# Patient Record
Sex: FEMALE | Race: ASIAN | NOT HISPANIC OR LATINO | Employment: FULL TIME | URBAN - METROPOLITAN AREA
[De-identification: names, ages, dates, MRNs, and addresses within clinical notes are randomized per-mention and may not be internally consistent; named-entity substitution may affect disease eponyms.]

---

## 2023-12-12 ENCOUNTER — OFFICE VISIT (OUTPATIENT)
Dept: FAMILY MEDICINE | Facility: CLINIC | Age: 38
End: 2023-12-12
Payer: COMMERCIAL

## 2023-12-12 VITALS
DIASTOLIC BLOOD PRESSURE: 69 MMHG | TEMPERATURE: 97.8 F | HEIGHT: 56 IN | SYSTOLIC BLOOD PRESSURE: 114 MMHG | WEIGHT: 113 LBS | OXYGEN SATURATION: 98 % | RESPIRATION RATE: 16 BRPM | HEART RATE: 74 BPM | BODY MASS INDEX: 25.42 KG/M2

## 2023-12-12 DIAGNOSIS — Z76.89 ENCOUNTER TO ESTABLISH CARE: Primary | ICD-10-CM

## 2023-12-12 DIAGNOSIS — M77.11 RIGHT LATERAL EPICONDYLITIS: ICD-10-CM

## 2023-12-12 PROCEDURE — 90686 IIV4 VACC NO PRSV 0.5 ML IM: CPT | Performed by: FAMILY MEDICINE

## 2023-12-12 PROCEDURE — 90471 IMMUNIZATION ADMIN: CPT | Performed by: FAMILY MEDICINE

## 2023-12-12 PROCEDURE — 99203 OFFICE O/P NEW LOW 30 MIN: CPT | Mod: 25 | Performed by: FAMILY MEDICINE

## 2023-12-12 NOTE — PROGRESS NOTES
"  Assessment & Plan     Encounter to establish care      Right lateral epicondylitis  Symptomatic care discussed included counterforce brace, consider PT, consider steroid injection down the road.  - Wrist/Arm Supplies Order Tennis Elbow Arm Band; Right    Review of external notes as documented elsewhere in note  40 minutes spent by me on the date of the encounter doing chart review, review of outside records, review of test results, interpretation of tests, patient visit, and documentation        BMI:   Estimated body mass index is 25.33 kg/m  as calculated from the following:    Height as of this encounter: 1.422 m (4' 8\").    Weight as of this encounter: 51.3 kg (113 lb).       This note was completed in part using a voice recognition software, any grammatical or context distortion are unintentional and inherent to the software.      Derek Gavin MD  [facility]    Yvon Schulz is a 38 year old, presenting for the following health issues:  Establish Care and Pain (Pain in the right arm)      2023     2:20 PM   Additional Questions   Roomed by hser   Accompanied by self       Pain    History of Present Illness       Reason for visit:  Establish care/ pain in the right arm    She eats 4 or more servings of fruits and vegetables daily.She consumes 1 sweetened beverage(s) daily.She exercises with enough effort to increase her heart rate 9 or less minutes per day.  She exercises with enough effort to increase her heart rate 3 or less days per week.   She is taking medications regularly.       She just recently relocated from [place], she is here to establish care, she complains of right lateral elbow pain, no injury mild to moderate throbbing pain exacerbated with movement, has not started any treatment.  Not currently taking any medication, past medical surgery included a  section on ovarian cyst.  She is  with 4 kids.      Review of Systems   Constitutional, HEENT, " "cardiovascular, pulmonary, gi and gu systems are negative, except as otherwise noted.      Objective    /69 (BP Location: Right arm, Patient Position: Sitting, Cuff Size: Adult Regular)   Pulse 74   Temp 97.8  F (36.6  C) (Temporal)   Resp 16   Ht 1.422 m (4' 8\")   Wt 51.3 kg (113 lb)   SpO2 98%   BMI 25.33 kg/m    Body mass index is 25.33 kg/m .  Physical Exam   GENERAL: healthy, alert and no distress  NECK: no adenopathy, no asymmetry, masses, or scars and thyroid normal to palpation  RESP: lungs clear to auscultation - no rales, rhonchi or wheezes  CV: regular rate and rhythm, normal S1 S2, no S3 or S4, no murmur, click or rub, no peripheral edema and peripheral pulses strong  ABDOMEN: soft, nontender, no hepatosplenomegaly, no masses and bowel sounds normal  MS: Mild tenderness at the right lateral epicondyle area, no swelling redness    No results found for any visits on 12/12/23.    This note was completed in part using a voice recognition software, any grammatical or context distortion are unintentional and inherent to the software.            Prior to immunization administration, verified patients identity using patient s name and date of birth. Please see Immunization Activity for additional information.     Screening Questionnaire for Adult Immunization    Are you sick today?   No   Do you have allergies to medications, food, a vaccine component or latex?   No   Have you ever had a serious reaction after receiving a vaccination?   No   Do you have a long-term health problem with heart, lung, kidney, or metabolic disease (e.g., diabetes), asthma, a blood disorder, no spleen, complement component deficiency, a cochlear implant, or a spinal fluid leak?  Are you on long-term aspirin therapy?   No   Do you have cancer, leukemia, HIV/AIDS, or any other immune system problem?   No   Do you have a parent, brother, or sister with an immune system problem?   No   In the past 3 months, have you taken " medications that affect  your immune system, such as prednisone, other steroids, or anticancer drugs; drugs for the treatment of rheumatoid arthritis, Crohn s disease, or psoriasis; or have you had radiation treatments?   No   Have you had a seizure, or a brain or other nervous system problem?   No   During the past year, have you received a transfusion of blood or blood    products, or been given immune (gamma) globulin or antiviral drug?   No   For women: Are you pregnant or is there a chance you could become       pregnant during the next month?   No   Have you received any vaccinations in the past 4 weeks?   No     Immunization questionnaire answers were all negative.      Patient instructed to remain in clinic for 15 minutes afterwards, and to report any adverse reactions.     Screening performed by Kelly Schulz MA on 12/12/2023 at 2:25 PM.

## 2023-12-13 PROBLEM — M77.11 RIGHT LATERAL EPICONDYLITIS: Status: ACTIVE | Noted: 2023-12-13

## 2025-07-27 ENCOUNTER — OFFICE VISIT (OUTPATIENT)
Dept: URGENT CARE | Facility: URGENT CARE | Age: 40
End: 2025-07-27
Payer: COMMERCIAL

## 2025-07-27 ENCOUNTER — VIRTUAL VISIT (OUTPATIENT)
Dept: INTERPRETER SERVICES | Facility: CLINIC | Age: 40
End: 2025-07-27

## 2025-07-27 VITALS
RESPIRATION RATE: 16 BRPM | WEIGHT: 122.6 LBS | SYSTOLIC BLOOD PRESSURE: 125 MMHG | HEART RATE: 89 BPM | DIASTOLIC BLOOD PRESSURE: 83 MMHG | TEMPERATURE: 98 F | BODY MASS INDEX: 27.58 KG/M2 | OXYGEN SATURATION: 98 %

## 2025-07-27 DIAGNOSIS — G43.109 MIGRAINE WITH AURA AND WITHOUT STATUS MIGRAINOSUS, NOT INTRACTABLE: Primary | ICD-10-CM

## 2025-07-27 PROCEDURE — T1013 SIGN LANG/ORAL INTERPRETER: HCPCS | Mod: U4

## 2025-07-27 PROCEDURE — 3074F SYST BP LT 130 MM HG: CPT

## 2025-07-27 PROCEDURE — 99214 OFFICE O/P EST MOD 30 MIN: CPT

## 2025-07-27 PROCEDURE — 3079F DIAST BP 80-89 MM HG: CPT

## 2025-07-27 RX ORDER — SUMATRIPTAN 50 MG/1
50 TABLET, FILM COATED ORAL
Qty: 30 TABLET | Refills: 1 | Status: SHIPPED | OUTPATIENT
Start: 2025-07-27

## 2025-07-27 NOTE — PROGRESS NOTES
Urgent Care Clinic Visit    Chief Complaint   Patient presents with    Headache     Rt side headache and throbbing pain since yesterday night. Vomiting yesterday. Nausea. Hx headache in the past.               7/27/2025     2:31 PM   Additional Questions   Roomed by Shakir Long MA   Accompanied by Daughter       Sage Bernal MD on 7/27/2025 at 2:32 PM    Assessment & Plan     Migraine with aura and without status migrainosus, not intractable  Patient's presentation is very consistent with migraines.  Will do a trial of triptans.  Provided counseling on this medication.  Patient previously had been receiving monthly injections for migraines.  I will put in a referral to neurology if she would like to have this done.  Is unclear what medication she was having injected for this was in [place] and an CARMINE would need to be completed.  Patient is going to follow-up with her primary care provider on the status of this referral and ongoing management of her migraines.  - SUMAtriptan (IMITREX) 50 MG tablet  Dispense: 30 tablet; Refill: 1  - Adult Neurology  Referral     No follow-ups on file.    Sage Bernal MD  [facility] [place]    Yvon Schulz is a 40 year old female who presents to clinic today for the following health issues:  Chief Complaint   Patient presents with    Headache     Rt side headache and throbbing pain since yesterday night. Vomiting yesterday. Nausea. Hx headache in the past.         7/27/2025     2:31 PM   Additional Questions   Roomed by Shakir Long MA   Accompanied by Daughter     HPI    Patient states that she had a headache last night at 7 pm, she describes it as a throbbing pain, she has had sensitivity to light and loud noises when these headaches appear. Use to need monthly injections for these headaches. Has had vision changes similar to aura with lights, blurriness and zig zag. Patient endorses these headaches occurring every 2-3 days.          Objective    /83   Pulse 89   Temp 98  F (36.7  C) (Oral)   Resp 16   Wt 55.6 kg (122 lb 9.6 oz)   SpO2 98%   BMI 27.58 kg/m    Physical Exam   GENERAL: Awake, alert, No acute distress.   HEENT: No scleral icterus or conjunctival injection.   SKIN: Warm and dry. No bruises, rashes, or skin lesions.  LUNGS: Normal work of breathing with no use of accessory muscles. Clear breath sounds in all lung fields bilaterally with no wheezes or crackles appreciated.  CARDIAC: RRR. Normal S1 and S2. No murmurs, clicks, or rubs appreciated. No peripheral edema.  ABDOMEN: Non-distended.   NEUROLOGIC: Alert and oriented. Sensation to light touch involving upper and lower extremities intact bilaterally.   EXTREMITIES: No gross deformity or peripheral edema. Appear well-perfused.

## 2025-07-27 NOTE — LETTER
July 27, 2025      Paw [address]  [address] [id number]        To Whom It May Concern:    Zeus Bond  was seen on 07/27/25.  Please excuse her  until 7/30/25 due to illness.      Sincerely,    Sage Bernal MD

## 2025-07-27 NOTE — LETTER
2025    Zeus Bond   1985        To Whom it May Concern;    Please excuse Zeus Bond from work/school for a healthcare visit on 2025.    Sincerely,        Sage Bernal MD

## 2025-07-28 ENCOUNTER — PATIENT OUTREACH (OUTPATIENT)
Dept: CARE COORDINATION | Facility: CLINIC | Age: 40
End: 2025-07-28
Payer: COMMERCIAL

## 2025-07-30 ENCOUNTER — PATIENT OUTREACH (OUTPATIENT)
Dept: CARE COORDINATION | Facility: CLINIC | Age: 40
End: 2025-07-30
Payer: COMMERCIAL